# Patient Record
Sex: MALE | Race: WHITE | NOT HISPANIC OR LATINO | URBAN - METROPOLITAN AREA
[De-identification: names, ages, dates, MRNs, and addresses within clinical notes are randomized per-mention and may not be internally consistent; named-entity substitution may affect disease eponyms.]

---

## 2023-06-30 ENCOUNTER — APPOINTMENT (OUTPATIENT)
Dept: URBAN - METROPOLITAN AREA SURGERY 17 | Age: 73
Setting detail: DERMATOLOGY
End: 2023-07-05

## 2023-06-30 VITALS
DIASTOLIC BLOOD PRESSURE: 78 MMHG | RESPIRATION RATE: 16 BRPM | TEMPERATURE: 98.2 F | HEIGHT: 69 IN | SYSTOLIC BLOOD PRESSURE: 144 MMHG | HEART RATE: 64 BPM | WEIGHT: 205 LBS

## 2023-06-30 DIAGNOSIS — Z80.8 FAMILY HISTORY OF MALIGNANT NEOPLASM OF OTHER ORGANS OR SYSTEMS: ICD-10-CM

## 2023-06-30 PROBLEM — C49.0 MALIGNANT NEOPLASM OF CONNECTIVE AND SOFT TISSUE OF HEAD, FACE AND NECK: Status: ACTIVE | Noted: 2023-06-30

## 2023-06-30 PROCEDURE — 17311 MOHS 1 STAGE H/N/HF/G: CPT

## 2023-06-30 PROCEDURE — OTHER COUNSELING: OTHER

## 2023-06-30 PROCEDURE — 17312 MOHS ADDL STAGE: CPT

## 2023-06-30 PROCEDURE — OTHER MIPS QUALITY: OTHER

## 2023-06-30 PROCEDURE — OTHER CONSULTATION FOR MOHS SURGERY: OTHER

## 2023-06-30 PROCEDURE — OTHER MOHS SURGERY: OTHER

## 2023-06-30 NOTE — PROCEDURE: MOHS SURGERY
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

## 2023-06-30 NOTE — PROCEDURE: CONSULTATION FOR MOHS SURGERY
Detail Level: Detailed
Body Location Override (Optional - Billing Will Still Be Based On Selected Body Map Location If Applicable): Right parietal scalp
Size Of Lesion: 0.5
Name Of The Referring Provider For Procedure: Dr. Barnes
Incorporate Mauc In Note: Yes

## 2023-06-30 NOTE — PROCEDURE: MOHS SURGERY

## 2023-06-30 NOTE — PROCEDURE: MOHS SURGERY

## 2023-06-30 NOTE — PROCEDURE: MOHS SURGERY
